# Patient Record
Sex: MALE | Race: OTHER | HISPANIC OR LATINO | Employment: FULL TIME | URBAN - METROPOLITAN AREA
[De-identification: names, ages, dates, MRNs, and addresses within clinical notes are randomized per-mention and may not be internally consistent; named-entity substitution may affect disease eponyms.]

---
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
## 2023-05-12 ENCOUNTER — HOSPITAL ENCOUNTER (EMERGENCY)
Facility: MEDICAL CENTER | Age: 23
End: 2023-05-12
Attending: EMERGENCY MEDICINE
Payer: COMMERCIAL

## 2023-05-12 VITALS
RESPIRATION RATE: 18 BRPM | HEART RATE: 68 BPM | OXYGEN SATURATION: 99 % | WEIGHT: 208.34 LBS | SYSTOLIC BLOOD PRESSURE: 124 MMHG | BODY MASS INDEX: 31.57 KG/M2 | HEIGHT: 68 IN | TEMPERATURE: 97 F | DIASTOLIC BLOOD PRESSURE: 55 MMHG

## 2023-05-12 DIAGNOSIS — N30.01 ACUTE CYSTITIS WITH HEMATURIA: ICD-10-CM

## 2023-05-12 LAB
APPEARANCE UR: CLEAR
BACTERIA #/AREA URNS HPF: ABNORMAL /HPF
BILIRUB UR QL STRIP.AUTO: NEGATIVE
COLOR UR: YELLOW
EPI CELLS #/AREA URNS HPF: ABNORMAL /HPF
GLUCOSE UR STRIP.AUTO-MCNC: NEGATIVE MG/DL
HYALINE CASTS #/AREA URNS LPF: ABNORMAL /LPF
KETONES UR STRIP.AUTO-MCNC: NEGATIVE MG/DL
LEUKOCYTE ESTERASE UR QL STRIP.AUTO: ABNORMAL
MICRO URNS: ABNORMAL
NITRITE UR QL STRIP.AUTO: NEGATIVE
PH UR STRIP.AUTO: 6 [PH] (ref 5–8)
PROT UR QL STRIP: NEGATIVE MG/DL
RBC # URNS HPF: ABNORMAL /HPF
RBC UR QL AUTO: ABNORMAL
SP GR UR STRIP.AUTO: 1.02
UROBILINOGEN UR STRIP.AUTO-MCNC: 1 MG/DL
WBC #/AREA URNS HPF: ABNORMAL /HPF

## 2023-05-12 PROCEDURE — A9270 NON-COVERED ITEM OR SERVICE: HCPCS | Performed by: EMERGENCY MEDICINE

## 2023-05-12 PROCEDURE — 87086 URINE CULTURE/COLONY COUNT: CPT

## 2023-05-12 PROCEDURE — 81001 URINALYSIS AUTO W/SCOPE: CPT

## 2023-05-12 PROCEDURE — 99283 EMERGENCY DEPT VISIT LOW MDM: CPT

## 2023-05-12 PROCEDURE — 700102 HCHG RX REV CODE 250 W/ 637 OVERRIDE(OP): Performed by: EMERGENCY MEDICINE

## 2023-05-12 RX ORDER — NITROFURANTOIN 25; 75 MG/1; MG/1
100 CAPSULE ORAL 2 TIMES DAILY
Qty: 14 CAPSULE | Refills: 0 | Status: ACTIVE | OUTPATIENT
Start: 2023-05-12 | End: 2023-05-19

## 2023-05-12 RX ORDER — NITROFURANTOIN 25; 75 MG/1; MG/1
100 CAPSULE ORAL ONCE
Status: COMPLETED | OUTPATIENT
Start: 2023-05-12 | End: 2023-05-12

## 2023-05-12 RX ADMIN — NITROFURANTOIN MONOHYDRATE/MACROCRYSTALLINE 100 MG: 25; 75 CAPSULE ORAL at 22:07

## 2023-05-13 NOTE — ED TRIAGE NOTES
Chief Complaint   Patient presents with    Blood in Urine     Pt reports using restroom tonight and had a couple drops of blood. Denies painful urination.       Pt to triage with steady gait for above complaint. Presents with drops of blood during urination tonight, pt reports this is the second time this week. Denies trauma to area or painful urination.    Pt back to lobby, educated on triage process and encourage to alert staff of any changes.     Vitals:    05/12/23 2030   BP: (!) 143/78   Pulse: 71   Resp: (!) 22   Temp: 36.1 °C (97 °F)   SpO2: 98%

## 2023-05-13 NOTE — ED NOTES
Pt discharged, educated on discharge instructions.  Patient was informed about diagnosis, symptom management, risks, and home care instructions.  Patient verbalized understanding and signed discharge instructions. Copy of discharge instructions in chart.  Patient ambulated out with steady gait.  Patient has personal belongings.  Work note provided with return tomorrow.   Marycarmen Khalil)  Neurosurgery  [address] [place] [address], [address]  [place] [address]  Phone: [phone]  Fax: [phone]  Follow Up Time:

## 2023-05-13 NOTE — DISCHARGE INSTRUCTIONS
You were seen in the Emergency Department for blood in urine.    Urine test showed evidence of infection.    Please use 1,000mg of tylenol or 600mg of ibuprofen every 6 hours as needed for pain.  Take antibiotics as directed.    Please follow up with your primary care physician.    Return to the Emergency Department with abdominal or back  pain, fevers, vomiting, or other concerns.

## 2023-05-14 LAB
BACTERIA UR CULT: NORMAL
SIGNIFICANT IND 70042: NORMAL
SITE SITE: NORMAL
SOURCE SOURCE: NORMAL

## 2023-05-18 ENCOUNTER — HOSPITAL ENCOUNTER (EMERGENCY)
Facility: MEDICAL CENTER | Age: 23
End: 2023-05-18
Attending: EMERGENCY MEDICINE
Payer: COMMERCIAL

## 2023-05-18 VITALS
HEART RATE: 83 BPM | TEMPERATURE: 97.7 F | SYSTOLIC BLOOD PRESSURE: 121 MMHG | DIASTOLIC BLOOD PRESSURE: 75 MMHG | HEIGHT: 68 IN | BODY MASS INDEX: 32.38 KG/M2 | RESPIRATION RATE: 16 BRPM | OXYGEN SATURATION: 98 % | WEIGHT: 213.63 LBS

## 2023-05-18 DIAGNOSIS — N39.0 URINARY TRACT INFECTION WITHOUT HEMATURIA, SITE UNSPECIFIED: ICD-10-CM

## 2023-05-18 DIAGNOSIS — R82.81 PYURIA: ICD-10-CM

## 2023-05-18 DIAGNOSIS — R30.0 DYSURIA: ICD-10-CM

## 2023-05-18 LAB
APPEARANCE UR: CLEAR
BACTERIA #/AREA URNS HPF: NEGATIVE /HPF
BILIRUB UR QL STRIP.AUTO: NEGATIVE
C TRACH DNA SPEC QL NAA+PROBE: POSITIVE
COLOR UR: YELLOW
EPI CELLS #/AREA URNS HPF: NEGATIVE /HPF
GLUCOSE UR STRIP.AUTO-MCNC: NEGATIVE MG/DL
HYALINE CASTS #/AREA URNS LPF: ABNORMAL /LPF
KETONES UR STRIP.AUTO-MCNC: NEGATIVE MG/DL
LEUKOCYTE ESTERASE UR QL STRIP.AUTO: ABNORMAL
MICRO URNS: ABNORMAL
N GONORRHOEA DNA SPEC QL NAA+PROBE: NEGATIVE
NITRITE UR QL STRIP.AUTO: NEGATIVE
PH UR STRIP.AUTO: 6 [PH] (ref 5–8)
PROT UR QL STRIP: NEGATIVE MG/DL
RBC # URNS HPF: ABNORMAL /HPF
RBC UR QL AUTO: ABNORMAL
SP GR UR STRIP.AUTO: 1.01
SPECIMEN SOURCE: ABNORMAL
UROBILINOGEN UR STRIP.AUTO-MCNC: 0.2 MG/DL
WBC #/AREA URNS HPF: ABNORMAL /HPF

## 2023-05-18 PROCEDURE — 87086 URINE CULTURE/COLONY COUNT: CPT

## 2023-05-18 PROCEDURE — 99284 EMERGENCY DEPT VISIT MOD MDM: CPT

## 2023-05-18 PROCEDURE — 81001 URINALYSIS AUTO W/SCOPE: CPT

## 2023-05-18 PROCEDURE — A9270 NON-COVERED ITEM OR SERVICE: HCPCS | Performed by: EMERGENCY MEDICINE

## 2023-05-18 PROCEDURE — 87491 CHLMYD TRACH DNA AMP PROBE: CPT

## 2023-05-18 PROCEDURE — 96372 THER/PROPH/DIAG INJ SC/IM: CPT

## 2023-05-18 PROCEDURE — 700111 HCHG RX REV CODE 636 W/ 250 OVERRIDE (IP): Performed by: EMERGENCY MEDICINE

## 2023-05-18 PROCEDURE — 87591 N.GONORRHOEAE DNA AMP PROB: CPT

## 2023-05-18 PROCEDURE — 700102 HCHG RX REV CODE 250 W/ 637 OVERRIDE(OP): Performed by: EMERGENCY MEDICINE

## 2023-05-18 RX ORDER — AZITHROMYCIN 250 MG/1
1000 TABLET, FILM COATED ORAL ONCE
Status: COMPLETED | OUTPATIENT
Start: 2023-05-18 | End: 2023-05-18

## 2023-05-18 RX ORDER — CEFDINIR 300 MG/1
300 CAPSULE ORAL 2 TIMES DAILY
Qty: 14 CAPSULE | Refills: 0 | Status: ACTIVE | OUTPATIENT
Start: 2023-05-18 | End: 2023-05-25

## 2023-05-18 RX ORDER — CEFTRIAXONE 500 MG/1
500 INJECTION, POWDER, FOR SOLUTION INTRAMUSCULAR; INTRAVENOUS ONCE
Status: COMPLETED | OUTPATIENT
Start: 2023-05-18 | End: 2023-05-18

## 2023-05-18 RX ADMIN — AZITHROMYCIN MONOHYDRATE 1000 MG: 250 TABLET ORAL at 07:10

## 2023-05-18 RX ADMIN — CEFTRIAXONE SODIUM 500 MG: 500 INJECTION, POWDER, FOR SOLUTION INTRAMUSCULAR; INTRAVENOUS at 07:10

## 2023-05-18 NOTE — ED PROVIDER NOTES
"  ER Provider Note    Scribed for Sotero Julian M.D. by Tanvi Rob. 5/18/2023   6:19 AM    Primary Care Provider: Pcp Pt States None    CHIEF COMPLAINT  Chief Complaint   Patient presents with    Blood in Urine     Pt was seen here a week ago for small amounts of blood in urine. Pt d/c with ATB. Pt reports quentin, large blood in urine this morning.     EXTERNAL RECORDS REVIEWED  Outpatient Notes the patient was seen here 6 days ago for hematuria. Urinalysis showed evidence of infection. Culture was negative. He was treated with Macrobid.     HPI/ROS  LIMITATION TO HISTORY   Select: : None  OUTSIDE HISTORIAN(S):  Significant other girlfriends    Ryan Jack is a 23 y.o. male who presents to the ED for evaluation of hematuria onset this morning. Per patient, he experienced hematuria 6 days ago, and states his symptoms returned this morning with small amounts of blood in his urine. The patient states he also has pain with urination, but denies any fever. He describes his pain as burning. No alleviating or exacerbating factors were noted. He was prescribed Macrobid at his last visit, and reports taking this as prescribed.    PAST MEDICAL HISTORY  History reviewed. No pertinent past medical history.    SURGICAL HISTORY  History reviewed. No pertinent surgical history.    FAMILY HISTORY  None noted    SOCIAL HISTORY   reports that he has quit smoking. His smoking use included cigarettes. He has never used smokeless tobacco. He reports current alcohol use. He reports current drug use. Drug: Inhaled.    CURRENT MEDICATIONS  Discharge Medication List as of 5/18/2023  7:03 AM        CONTINUE these medications which have NOT CHANGED    Details   nitrofurantoin (MACROBID) 100 MG Cap Take 1 Capsule by mouth 2 times a day for 7 days., Disp-14 Capsule, R-0, Normal             ALLERGIES  No Known Allergies     PHYSICAL EXAM  BP (!) 144/72   Pulse 76   Temp 36.6 °C (97.9 °F) (Temporal)   Resp 16   Ht 1.727 m (5' 8\")   " Wt 96.9 kg (213 lb 10 oz)   SpO2 100%   BMI 32.48 kg/m²      Nursing note and vitals reviewed.  Constitutional: Well-developed and well-nourished. No distress.   HENT: Head is normocephalic and atraumatic. Oropharynx is clear and moist without exudate or erythema.   Eyes: Pupils are equal, round, and reactive to light. Conjunctiva are normal.   Cardiovascular: Normal rate and regular rhythm. No murmur heard. Normal radial pulses.  Pulmonary/Chest: Breath sounds normal. No wheezes or rales.   Abdominal: Soft and non-tender. No distention  No CVA tenderness  Musculoskeletal: Extremities exhibit normal range of motion without edema or tenderness.   Neurological: Awake, alert and oriented to person, place, and time. No focal deficits noted.  Skin: Skin is warm and dry. No rash.   Psychiatric: Normal mood and affect. Appropriate for clinical situation    DIAGNOSTIC STUDIES    Labs:   Results for orders placed or performed during the hospital encounter of 05/18/23   Urinalysis, Culture if Indicated    Specimen: Urine   Result Value Ref Range    Color Yellow     Character Clear     Specific Gravity 1.006 <1.035    Ph 6.0 5.0 - 8.0    Glucose Negative Negative mg/dL    Ketones Negative Negative mg/dL    Protein Negative Negative mg/dL    Bilirubin Negative Negative    Urobilinogen, Urine 0.2 Negative    Nitrite Negative Negative    Leukocyte Esterase Moderate (A) Negative    Occult Blood Large (A) Negative    Micro Urine Req Microscopic    URINE MICROSCOPIC (W/UA)   Result Value Ref Range    WBC 20-50 (A) /hpf    RBC 2-5 (A) /hpf    Bacteria Negative None /hpf    Epithelial Cells Negative /hpf    Hyaline Cast 0-2 /lpf   Chlamydia/GC, PCR (Urine)    Specimen: Urine   Result Value Ref Range    Source Urine      INITIAL ASSESSMENT AND PLAN    6:19 AM - Patient was evaluated at bedside with significant other. He is sitting up in the gurney in no acute distress. Ordered for Urine Culture, Chlamydia and UA Microscopic to  evaluate. The patient will be medicated with Zithromax 1,000 mg and Rocephin 500 mg for his symptoms. Patient verbalizes understanding and support with my plan of care.  Differential diagnoses include but not limited to: UTI vs STI. No significant pain to make me concerned for ureterolithiasis. The patient was instructed to follow up with his results on MyChart. He was advised of all return precautions. Patient verbalizes understanding and agreement to this plan of care.   Patient treated empirically for sexually transmitted infection.  PCR test pending.  We will also give the patient a prescription for Omnicef for urinary tract infection.  Urine culture pending.  Previous urine culture negative.    ED Observation Status? No; Patient does not meet criteria for ED Observation.        DISPOSITION AND DISCUSSIONS    I have discussed management of the patient with the following physicians and TARYN's:  None noted    Discussion of management with other QHP or appropriate source(s): None     Escalation of care considered, and ultimately not performed: diagnostic imaging, no significant pain to make me concerned for ureteral lithiasis. Therefore I do not feel that a CT scan would be beneficial.    Barriers to care at this time, including but not limited to:  None noted .     Decision tools and prescription drugs considered including, but not limited to: Pain Medications I considered prescribing narcotic pain medication, however I feel pain should be adequately controlled with over the counter NSAIDs.    The patient will return for new or worsening symptoms and is stable at the time of discharge.    DISPOSITION:  Patient will be discharged home in stable condition.    FOLLOW UP:  [facility], Emergency Dept  [address] [id number] [phone]    If symptoms worsen      OUTPATIENT MEDICATIONS:  Discharge Medication List as of 5/18/2023  7:03 AM        START taking these medications     Details   cefdinir (OMNICEF) 300 MG Cap Take 1 Capsule by mouth 2 times a day for 7 days., Disp-14 Capsule, R-0, Normal           FINAL DIANGOSIS  1. Pyuria    2. Dysuria    3. Urinary tract infection without hematuria, site unspecified         Tanvi ZHANG (Scribe), am scribing for, and in the presence of, Sotero Julian M.D..    Electronically signed by: Tanvi Rob (Scribe), 5/18/2023    ISotero M.D. personally performed the services described in this documentation, as scribed by Tanvi Rob in my presence, and it is both accurate and complete.      The note accurately reflects work and decisions made by me.  Sotero Julian M.D.  5/18/2023  1:28 PM

## 2023-05-18 NOTE — ED TRIAGE NOTES
Chief Complaint   Patient presents with    Blood in Urine     Pt was seen here a week ago for small amounts of blood in urine. Pt d/c with ATB. Pt reports quentin, large blood in urine this morning.     Pt ambulatory to triage for above complaint. Pt still taking ATB as prescribed      Pt is alert & oriented and follows commands. Pt speaking in full sentences and responds appropriately to questions. No acute distress noted in triage. Respirations are even and unlabored. Skin is pink/warm/dry.    Pt placed back in lobby and educated on triage process. Pt encouraged to alert staff to any changes in condition.

## 2023-05-18 NOTE — ED NOTES
Pt medicated per MAR with ABX. Pt provided discharge instructions and follow-up information. Pt verbalized understanding and all questions answered. Pt ambulated from ED with steady gait carrying all belongings.

## 2023-05-20 LAB
BACTERIA UR CULT: NORMAL
SIGNIFICANT IND 70042: NORMAL
SITE SITE: NORMAL
SOURCE SOURCE: NORMAL

## 2023-05-20 NOTE — ED NOTES
"ED Positive Culture Follow-up/Notification Note:    Date: 5/19/2023     Patient seen in the ED on 5/18/2023 for hematuria. Patient was seen in the ED 6 days prior with the same complaint, treated with Macrobid, urine culture negative. Describes burning pain with urination. No fever, no leukocytosis, no CVA tenderness. Patient was given Rocephin and azithromycin 1 g in the ED and discharged on 7 days of cefdinir.  1. Pyuria    2. Dysuria    3. Urinary tract infection without hematuria, site unspecified       Discharge Medication List as of 5/18/2023  7:03 AM        START taking these medications    Details   cefdinir (OMNICEF) 300 MG Cap Take 1 Capsule by mouth 2 times a day for 7 days., Disp-14 Capsule, R-0, Normal             Allergies: Patient has no known allergies.     Vitals:    05/18/23 0539 05/18/23 0541 05/18/23 0615   BP: (!) 144/72  121/75   Pulse: 76  83   Resp: 16  16   Temp: 36.6 °C (97.9 °F)  36.5 °C (97.7 °F)   TempSrc: Temporal  Temporal   SpO2: 100%  98%   Weight:  96.9 kg (213 lb 10 oz)    Height:  1.727 m (5' 8\")        Final cultures:   Results       Procedure Component Value Units Date/Time    URINE CULTURE(NEW) [[id number]] Collected: 05/18/23 0551    Order Status: Completed Specimen: Urine Updated: 05/19/23 1351     Significant Indicator NEG     Source UR     Site -     Culture Result Culture in progress.    Narrative:      Indication for culture:->Patient WITHOUT an indwelling Smith  catheter in place with new onset of Dysuria, Frequency,  Urgency, and/or Suprapubic pain    Chlamydia/GC, PCR (Urine) [[id number]]  (Abnormal) Collected: 05/18/23 0551    Order Status: Completed Specimen: Urine Updated: 05/18/23 1740     C. trachomatis by PCR POSITIVE     Gc By Dna Probe Negative     Source Urine    Urinalysis, Culture if Indicated [[id number]]  (Abnormal) Collected: 05/18/23 0551    Order Status: Completed Specimen: Urine Updated: 05/18/23 0644     Color Yellow     Character Clear     Specific " Gravity 1.006     Ph 6.0     Glucose Negative mg/dL      Ketones Negative mg/dL      Protein Negative mg/dL      Bilirubin Negative     Urobilinogen, Urine 0.2     Nitrite Negative     Leukocyte Esterase Moderate     Occult Blood Large     Micro Urine Req Microscopic    Narrative:      Indication for culture:->Patient WITHOUT an indwelling Smith  catheter in place with new onset of Dysuria, Frequency,  Urgency, and/or Suprapubic pain    URINE CULTURE(NEW) [[id number]] Collected: 05/18/23 0000    Order Status: Canceled Specimen: Other from Urine, Clean Catch             Plan:   Urine culture is no growth to date. Patient is positive for chlamydia, which was treated appropriately in the ED. I called the patient to discuss the result, provide education, ask about persistent symptoms, and if there are no persistent symptoms ask he stop cefdinir. I explained chlamydia is a sexually transmitted infection, he has already been treated for it in the ED, and he should contact partners from the past 60 days to seek testing and treatment. He is still experiencing some burning with urination, and only took his first dose of cefdinir last night, so with urine cultures pending I did not ask him to stop cefdinir.    [place] Almanza, GuillermoD

## 2024-09-03 ENCOUNTER — OFFICE VISIT (OUTPATIENT)
Dept: URGENT CARE | Facility: PHYSICIAN GROUP | Age: 24
End: 2024-09-03
Payer: COMMERCIAL

## 2024-09-03 VITALS
OXYGEN SATURATION: 99 % | BODY MASS INDEX: 33.43 KG/M2 | HEIGHT: 66 IN | RESPIRATION RATE: 18 BRPM | HEART RATE: 86 BPM | TEMPERATURE: 98.2 F | DIASTOLIC BLOOD PRESSURE: 84 MMHG | WEIGHT: 208 LBS | SYSTOLIC BLOOD PRESSURE: 128 MMHG

## 2024-09-03 DIAGNOSIS — U07.1 COVID-19: ICD-10-CM

## 2024-09-03 DIAGNOSIS — J06.9 URI WITH COUGH AND CONGESTION: ICD-10-CM

## 2024-09-03 LAB
FLUAV RNA SPEC QL NAA+PROBE: NEGATIVE
FLUBV RNA SPEC QL NAA+PROBE: NEGATIVE
RSV RNA SPEC QL NAA+PROBE: NEGATIVE
SARS-COV-2 RNA RESP QL NAA+PROBE: POSITIVE

## 2024-09-03 PROCEDURE — 3074F SYST BP LT 130 MM HG: CPT | Performed by: STUDENT IN AN ORGANIZED HEALTH CARE EDUCATION/TRAINING PROGRAM

## 2024-09-03 PROCEDURE — 3079F DIAST BP 80-89 MM HG: CPT | Performed by: STUDENT IN AN ORGANIZED HEALTH CARE EDUCATION/TRAINING PROGRAM

## 2024-09-03 PROCEDURE — 99213 OFFICE O/P EST LOW 20 MIN: CPT | Performed by: STUDENT IN AN ORGANIZED HEALTH CARE EDUCATION/TRAINING PROGRAM

## 2024-09-03 PROCEDURE — 0241U POCT CEPHEID COV-2, FLU A/B, RSV - PCR: CPT | Performed by: STUDENT IN AN ORGANIZED HEALTH CARE EDUCATION/TRAINING PROGRAM

## 2024-09-03 NOTE — PROGRESS NOTES
"Subjective     Ryan Jack is a 24 y.o. male who presents with Nasal Congestion (Loss of taste and smell, fatigue x 4 days )            Ryan is a 24 y.o. male who presents with nasal congestion and cough. Currently on day #3 of symptoms. Concerned for Covid because he reports loss of taste/smell. No fever/chills. No SOB/wheezing.         Review of Systems   Constitutional:  Positive for malaise/fatigue. Negative for fever.   HENT:  Positive for congestion. Negative for ear pain and sore throat.    Respiratory:  Positive for cough. Negative for shortness of breath and wheezing.    Cardiovascular:  Negative for chest pain and palpitations.   Gastrointestinal:  Negative for abdominal pain, constipation, nausea and vomiting.   All other systems reviewed and are negative.             Objective     /84 (BP Location: Right arm, Patient Position: Sitting, BP Cuff Size: Adult)   Pulse 86   Temp 36.8 °C (98.2 °F) (Temporal)   Resp 18   Ht 1.676 m (5' 6\")   Wt 94.3 kg (208 lb)   SpO2 99%   BMI 33.57 kg/m²      Physical Exam  Vitals reviewed.   Constitutional:       General: He is not in acute distress.     Appearance: Normal appearance.   HENT:      Head: Normocephalic.      Right Ear: Tympanic membrane, ear canal and external ear normal.      Left Ear: Tympanic membrane, ear canal and external ear normal.      Nose: Congestion present.      Mouth/Throat:      Mouth: Mucous membranes are moist.      Pharynx: Oropharynx is clear.   Eyes:      Extraocular Movements: Extraocular movements intact.      Conjunctiva/sclera: Conjunctivae normal.      Pupils: Pupils are equal, round, and reactive to light.   Cardiovascular:      Rate and Rhythm: Normal rate and regular rhythm.   Pulmonary:      Effort: Pulmonary effort is normal.      Breath sounds: Normal breath sounds.   Skin:     General: Skin is warm and dry.   Neurological:      General: No focal deficit present.      Mental Status: He is alert and " oriented to person, place, and time.                             Assessment & Plan        Assessment & Plan  COVID-19         URI with cough and congestion    Orders:    POCT CoV-2, Flu A/B, RSV by PCR           Differential diagnoses, supportive care measures (rest, induration, OTC Tylenol/ibuprofen, nasal saline rinses, throat lozenges, humidified air) and indications for immediate follow-up discussed with patient. Pathogenesis of diagnosis discussed including typical length and natural progression.      Instructed to return to urgent care or nearest emergency department if symptoms fail to improve, for any change in condition, further concerns, or new concerning symptoms.    Patient states understanding and agrees with the plan of care and discharge instructions.

## 2024-09-06 ENCOUNTER — OFFICE VISIT (OUTPATIENT)
Dept: URGENT CARE | Facility: PHYSICIAN GROUP | Age: 24
End: 2024-09-06
Payer: COMMERCIAL

## 2024-09-06 VITALS
OXYGEN SATURATION: 99 % | TEMPERATURE: 98.5 F | WEIGHT: 209 LBS | HEART RATE: 83 BPM | SYSTOLIC BLOOD PRESSURE: 126 MMHG | DIASTOLIC BLOOD PRESSURE: 76 MMHG | HEIGHT: 66 IN | RESPIRATION RATE: 18 BRPM | BODY MASS INDEX: 33.59 KG/M2

## 2024-09-06 DIAGNOSIS — J06.9 UPPER RESPIRATORY INFECTION, ACUTE: ICD-10-CM

## 2024-09-06 PROCEDURE — 3078F DIAST BP <80 MM HG: CPT

## 2024-09-06 PROCEDURE — 99213 OFFICE O/P EST LOW 20 MIN: CPT

## 2024-09-06 PROCEDURE — 3074F SYST BP LT 130 MM HG: CPT

## 2024-09-06 ASSESSMENT — ENCOUNTER SYMPTOMS
COUGH: 0
VOMITING: 0
FEVER: 0
CHILLS: 0
SHORTNESS OF BREATH: 0
SORE THROAT: 0
MYALGIAS: 0
NAUSEA: 0
SINUS PAIN: 0
ABDOMINAL PAIN: 0

## 2024-09-06 NOTE — PROGRESS NOTES
"Chief Complaint   Patient presents with    Letter for School/Work     Pt tested positive for covid 3 days ago, would like work note for today and tomorrow       HISTORY OF PRESENT ILLNESS: Patient is a pleasant 24 y.o. male who presents to urgent care today patient tested positive for COVID 3 days ago, symptoms started last week, he is seeking a note to return to work.    There are no problems to display for this patient.      Allergies:Patient has no known allergies.    No current [facility]-ordered outpatient medications on file.     No current [facility]-ordered facility-administered medications on file.       History reviewed. No pertinent past medical history.    Social History     Tobacco Use    Smoking status: Former     Types: Cigarettes    Smokeless tobacco: Never   Vaping Use    Vaping status: Every Day    Substances: Nicotine   Substance Use Topics    Alcohol use: Yes     Comment: occ    Drug use: Yes     Types: Inhaled     Comment: marijuana       No family status information on file.   History reviewed. No pertinent family history.    Review of Systems   Constitutional:  Negative for chills, fever and malaise/fatigue.   HENT:  Positive for congestion. Negative for sinus pain and sore throat.    Respiratory:  Negative for cough and shortness of breath.    Gastrointestinal:  Negative for abdominal pain, nausea and vomiting.   Musculoskeletal:  Negative for myalgias.       Exam:  /76 (BP Location: Left arm, Patient Position: Sitting, BP Cuff Size: Adult)   Pulse 83   Temp 36.9 °C (98.5 °F) (Temporal)   Resp 18   Ht 1.676 m (5' 6\")   Wt 94.8 kg (209 lb)   SpO2 99%   Physical Exam  Vitals reviewed.   Constitutional:       Appearance: Normal appearance.   HENT:      Head: Normocephalic.      Right Ear: Tympanic membrane and ear canal normal. There is no impacted cerumen. Tympanic membrane is not injected or erythematous.      Left Ear: Tympanic membrane and ear canal normal. There is no impacted cerumen. " Tympanic membrane is not injected or erythematous.      Nose: Congestion present. No rhinorrhea.      Mouth/Throat:      Mouth: Mucous membranes are moist.      Pharynx: Oropharynx is clear. No oropharyngeal exudate or posterior oropharyngeal erythema.      Tonsils: No tonsillar exudate. 0 on the right. 0 on the left.   Eyes:      General:         Right eye: No discharge.         Left eye: No discharge.      Extraocular Movements: Extraocular movements intact.      Conjunctiva/sclera: Conjunctivae normal.      Pupils: Pupils are equal, round, and reactive to light.   Cardiovascular:      Rate and Rhythm: Normal rate and regular rhythm.      Pulses: Normal pulses.      Heart sounds: Normal heart sounds. No murmur heard.  Pulmonary:      Effort: Pulmonary effort is normal. No respiratory distress.      Breath sounds: Normal breath sounds. No stridor. No wheezing or rhonchi.   Chest:      Chest wall: No tenderness.   Musculoskeletal:         General: No swelling, tenderness, deformity or signs of injury. Normal range of motion.      Cervical back: Normal range of motion.      Right lower leg: No edema.      Left lower leg: No edema.   Lymphadenopathy:      Cervical: No cervical adenopathy.   Skin:     General: Skin is warm and dry.      Findings: No bruising or rash.   Neurological:      General: No focal deficit present.      Mental Status: He is alert.      Sensory: No sensory deficit.      Motor: No weakness.      Coordination: Coordination normal.      Gait: Gait normal.   Psychiatric:         Mood and Affect: Mood normal.         Behavior: Behavior normal.         Thought Content: Thought content normal.         Judgment: Judgment normal.     Assessment/Plan:  1. Upper respiratory infection, acute    Based on physical exam along with review of systems I did provide a work note.  Patient's current assessment appears mostly benign, he is slightly congested however lung sounds are clear to auscultation, vitals are  within normal limits.  Per CDC guidelines patient is 5 days out from the onset of symptoms, he is 24 hours fever free.  Work note was provided.    Supportive care, differential diagnoses, and indications for immediate follow-up discussed with patient.   Pathogenesis of diagnosis discussed including typical length and natural progression.   Instructed to return to clinic or nearest emergency department for any change in condition, further concerns, or worsening of symptoms.  Patient states understanding of the plan of care and discharge instructions.  Instructed to make an appointment, for follow up, with primary care provider.      Please note that this dictation was created using voice recognition software. I have made every reasonable attempt to correct obvious errors, but I expect that there are errors of grammar and possibly content that I did not discover before finalizing the note.      Veronika CORRAL

## 2024-09-06 NOTE — LETTER
[facility] URGENT CARE [place]  [address]  [place] [address]     September 6, 2024    Patient: Ryan Jack   YOB: 2000   Date of Visit: 9/6/2024       To Whom It May Concern:    Ryan Jack was seen and treated in our department on 9/6/2024. Per cdc guidelines patient to stay home five days from the onset of symptoms or 24 hours fevers free.  Please excuse from work during this last week and today 09/06/24.      Sincerely,     TAMEKA Beckman.

## 2024-09-11 ASSESSMENT — ENCOUNTER SYMPTOMS
SHORTNESS OF BREATH: 0
CONSTIPATION: 0
COUGH: 1
FEVER: 0
ABDOMINAL PAIN: 0
PALPITATIONS: 0
WHEEZING: 0
VOMITING: 0
NAUSEA: 0
SORE THROAT: 0

## 2025-05-25 ENCOUNTER — HOSPITAL ENCOUNTER (EMERGENCY)
Facility: MEDICAL CENTER | Age: 25
End: 2025-05-25
Attending: STUDENT IN AN ORGANIZED HEALTH CARE EDUCATION/TRAINING PROGRAM
Payer: COMMERCIAL

## 2025-05-25 VITALS
BODY MASS INDEX: 34.84 KG/M2 | WEIGHT: 222 LBS | SYSTOLIC BLOOD PRESSURE: 122 MMHG | RESPIRATION RATE: 18 BRPM | HEIGHT: 67 IN | HEART RATE: 78 BPM | TEMPERATURE: 98 F | OXYGEN SATURATION: 98 % | DIASTOLIC BLOOD PRESSURE: 70 MMHG

## 2025-05-25 DIAGNOSIS — R10.32 LEFT INGUINAL PAIN: ICD-10-CM

## 2025-05-25 DIAGNOSIS — K40.90 LEFT INGUINAL HERNIA: Primary | ICD-10-CM

## 2025-05-25 PROCEDURE — 99282 EMERGENCY DEPT VISIT SF MDM: CPT

## 2025-05-26 NOTE — ED TRIAGE NOTES
Pt presents with friend from home for left testicular pain that began 1 hour ago. Denies problems urinary or discharge. No new sexual partners. No trauma. Pt A&Ox4 and ambulatory.

## 2025-05-26 NOTE — ED PROVIDER NOTES
"ED Provider Note    CHIEF COMPLAINT  Chief Complaint   Patient presents with    Testicle Pain       EXTERNAL RECORDS REVIEWED      HPI/ROS  LIMITATION TO HISTORY   Select: : None  OUTSIDE HISTORIAN(S):      Ryan Jack is a 25 y.o. male who presents for left groin pain that began earlier today.  Patient denies any precipitating episodes and reports the pain just started.  He denies any associated dysuria, testicular swelling, nausea or vomiting.  Reports the pain is about a 5 out of 10    PAST MEDICAL HISTORY       SURGICAL HISTORY  patient denies any surgical history    FAMILY HISTORY  History reviewed. No pertinent family history.    SOCIAL HISTORY  Social History     Tobacco Use    Smoking status: Former     Types: Cigarettes    Smokeless tobacco: Never   Vaping Use    Vaping status: Every Day    Substances: Nicotine   Substance and Sexual Activity    Alcohol use: Yes     Comment: occ    Drug use: Yes     Types: Inhaled     Comment: marijuana    Sexual activity: Not on file       CURRENT MEDICATIONS  Home Medications       Reviewed by Georgina Esteban R.N. (Registered Nurse) on 05/25/25 at 1732  Med List Status: Not Addressed     Medication Last Dose Status        Patient Jack Taking any Medications                           ALLERGIES  Allergies[1]    PHYSICAL EXAM  VITAL SIGNS: /70   Pulse 78   Temp 36.7 °C (98 °F) (Temporal)   Resp 18   Ht 1.702 m (5' 7\")   Wt 101 kg (222 lb 0.1 oz)   SpO2 98%   BMI 34.77 kg/m²    Physical Exam  Vitals and nursing note reviewed.   Constitutional:       Appearance: He is well-developed.   HENT:      Head: Normocephalic.   Cardiovascular:      Rate and Rhythm: Normal rate and regular rhythm.      Heart sounds: No murmur heard.  Pulmonary:      Effort: Pulmonary effort is normal.      Breath sounds: Normal breath sounds.   Abdominal:      Palpations: Abdomen is soft.      Tenderness: There is no abdominal tenderness.   Genitourinary:     Penis: Normal.     "   Testes: Normal.      Comments: No testicular tenderness swelling erythema or warmth cremasteric reflexes are positive bilaterally.  No epididymal swelling or tenderness.  No scrotal lesions, fullness, swelling or redness.  Point tenderness to palpation of the left inguinal canal with mild defect in the abdominal wall.  No palpable hernia is present  Musculoskeletal:         General: Normal range of motion.      Right lower leg: No edema.      Left lower leg: No edema.   Skin:     General: Skin is warm.   Neurological:      General: No focal deficit present.      Mental Status: He is alert and oriented to person, place, and time.               COURSE & MEDICAL DECISION MAKING    ASSESSMENT, COURSE AND PLAN  Care Narrative: 25-year-old male presenting with a left groin pain.  Patient has somewhat of a difficulty explaining the location of the pain as it is not quite in his testicle but not quite in his thigh.  On exam his testes and penis are overall normal and unremarkable.  I have an incredibly low suspicion for torsion based off of the history and normal exam in this area.  When assessing the left inguinal canal there is no hernia present but there is point tenderness at a small defect in the abdominal wall and the patient reports this is exactly where the pain is coming from.  Therefore I feel that the patient is likely developing a small hernia that is self reducing.  There are no additional abnormalities present at this time so do not feel further labs or imaging are warranted I did educate the patient on this condition and that should it worsen he might need surgical evaluation in the meantime I have advised avoiding heavy lifting and frequent straining patient denies a history of constipation.            ADDITIONAL PROBLEMS MANAGED  Past Medical History[2]    DISPOSITION AND DISCUSSIONS  I have discussed management of the patient with the following physicians and TARYN's:      Discussion of management with  other HP or appropriate source(s): None     Escalation of care considered, and ultimately not performed:diagnostic imaging    Barriers to care at this time, including but not limited to: Patient does not have established PCP.     Decision tools and prescription drugs considered including, but not limited to: .    FINAL DIAGNOSIS  1. Left inguinal hernia    2. Left inguinal pain         Electronically signed by: Ron Tuttle M.D., 5/25/2025 11:09 PM           [1] No Known Allergies  [2] History reviewed. No pertinent past medical history.